# Patient Record
Sex: MALE | Race: WHITE | URBAN - METROPOLITAN AREA
[De-identification: names, ages, dates, MRNs, and addresses within clinical notes are randomized per-mention and may not be internally consistent; named-entity substitution may affect disease eponyms.]

---

## 2019-07-29 NOTE — PATIENT DISCUSSION
(If Pt skips 6 months appt and is closer to 1 year, schedule Exam and OCT ONH (Gonio only if appears narrow).

## 2021-06-08 ENCOUNTER — NEW PATIENT PROBLEM FOCUSED (OUTPATIENT)
Dept: URBAN - METROPOLITAN AREA CLINIC 53 | Facility: CLINIC | Age: 86
End: 2021-06-08

## 2021-06-08 DIAGNOSIS — H11.31: ICD-10-CM

## 2021-06-08 PROCEDURE — 92002 INTRM OPH EXAM NEW PATIENT: CPT

## 2021-06-08 ASSESSMENT — TONOMETRY
OS_IOP_MMHG: 12
OD_IOP_MMHG: 13

## 2021-06-08 ASSESSMENT — VISUAL ACUITY
OD_CC: 20/20
OS_CC: 20/20
OS_CC: J1+
OD_CC: J1+

## 2021-06-08 NOTE — PATIENT DISCUSSION
Reassured Pt and explained condition. Advised may take several weeks to reabsorb completely. ATs recommended prn for comfort. Sample of systane given to patient to use 3 times a day cold from the fridge.  Discussed with patient that it is residual from the skin surgery he had.

## 2021-12-09 ENCOUNTER — COMPREHENSIVE EXAM (OUTPATIENT)
Dept: URBAN - METROPOLITAN AREA CLINIC 49 | Facility: CLINIC | Age: 86
End: 2021-12-09

## 2021-12-09 DIAGNOSIS — R73.09: ICD-10-CM

## 2021-12-09 DIAGNOSIS — H53.2: ICD-10-CM

## 2021-12-09 DIAGNOSIS — H43.813: ICD-10-CM

## 2021-12-09 DIAGNOSIS — H11.31: ICD-10-CM

## 2021-12-09 PROCEDURE — 92134 CPTRZ OPH DX IMG PST SGM RTA: CPT

## 2021-12-09 PROCEDURE — 92014 COMPRE OPH EXAM EST PT 1/>: CPT

## 2021-12-09 ASSESSMENT — TONOMETRY
OD_IOP_MMHG: 14
OS_IOP_MMHG: 16

## 2021-12-09 ASSESSMENT — VISUAL ACUITY
OD_CC: 20/20
OS_CC: 20/20

## 2022-01-05 ENCOUNTER — COMPREHENSIVE EXAM (OUTPATIENT)
Dept: URBAN - METROPOLITAN AREA CLINIC 49 | Facility: CLINIC | Age: 87
End: 2022-01-05

## 2022-01-05 DIAGNOSIS — H11.32: ICD-10-CM

## 2022-01-05 DIAGNOSIS — H01.00A: ICD-10-CM

## 2022-01-05 DIAGNOSIS — H01.00B: ICD-10-CM

## 2022-01-05 PROCEDURE — 92012 INTRM OPH EXAM EST PATIENT: CPT

## 2022-01-05 RX ORDER — ERYTHROMYCIN 5 MG/G
OINTMENT OPHTHALMIC EVERY EVENING
Start: 2022-01-05 | End: 2022-01-12

## 2022-01-05 ASSESSMENT — TONOMETRY
OS_IOP_MMHG: 16
OD_IOP_MMHG: 14

## 2022-01-05 ASSESSMENT — VISUAL ACUITY
OS_CC: 20/20-1
OD_CC: 20/20

## 2022-01-05 NOTE — PATIENT DISCUSSION
Cleaned lids in office with Ocuspft lid scrubs. Recommend start Ocusoft lid scrubs OU Qhs. Start Erythromycin OU Qhs for 7 days.

## 2022-06-29 NOTE — PATIENT DISCUSSION
"Pt reports ""ran out of Timolol years ago"" and has not been using Timolol for ""years"" because ""ran out"". "

## 2022-06-29 NOTE — PATIENT DISCUSSION
IOP elevated today. Advised Pt of elevated IOP and stressed importance of restarting drops to reduce and control IOP. Explained elevated IOP will cause optic nerve damage and permanent vision loss. Will reevaluate IOP in 3-4 weeks on drops.

## 2022-10-12 ENCOUNTER — COMPREHENSIVE EXAM (OUTPATIENT)
Dept: URBAN - METROPOLITAN AREA CLINIC 49 | Facility: CLINIC | Age: 87
End: 2022-10-12

## 2022-10-12 DIAGNOSIS — R73.09: ICD-10-CM

## 2022-10-12 DIAGNOSIS — H11.32: ICD-10-CM

## 2022-10-12 DIAGNOSIS — H43.813: ICD-10-CM

## 2022-10-12 PROCEDURE — 92015 DETERMINE REFRACTIVE STATE: CPT

## 2022-10-12 PROCEDURE — 92014 COMPRE OPH EXAM EST PT 1/>: CPT

## 2022-10-12 ASSESSMENT — VISUAL ACUITY
OS_GLARE: 20/50
OS_GLARE: 20/30
OD_GLARE: 20/40
OD_PH: 20/30
OD_GLARE: 20/25
OD_CC: J1+@17"
OD_SC: J10@17"
OS_SC: 20/30
OS_SC: J10@17"
OS_CC: J1@17"
OD_SC: 20/40
OD_CC: 20/40-1
OS_CC: 20/20

## 2022-10-12 ASSESSMENT — TONOMETRY
OD_IOP_MMHG: 13
OS_IOP_MMHG: 15

## 2023-10-19 ENCOUNTER — COMPREHENSIVE EXAM (OUTPATIENT)
Dept: URBAN - METROPOLITAN AREA CLINIC 49 | Facility: LOCATION | Age: 88
End: 2023-10-19

## 2023-10-19 DIAGNOSIS — H31.091: ICD-10-CM

## 2023-10-19 DIAGNOSIS — R73.09: ICD-10-CM

## 2023-10-19 DIAGNOSIS — H35.373: ICD-10-CM

## 2023-10-19 DIAGNOSIS — H43.813: ICD-10-CM

## 2023-10-19 DIAGNOSIS — H35.40: ICD-10-CM

## 2023-10-19 DIAGNOSIS — H04.123: ICD-10-CM

## 2023-10-19 DIAGNOSIS — H02.413: ICD-10-CM

## 2023-10-19 DIAGNOSIS — H53.2: ICD-10-CM

## 2023-10-19 PROCEDURE — 92015 DETERMINE REFRACTIVE STATE: CPT

## 2023-10-19 PROCEDURE — 99214 OFFICE O/P EST MOD 30 MIN: CPT

## 2023-10-19 PROCEDURE — 92134 CPTRZ OPH DX IMG PST SGM RTA: CPT

## 2023-10-19 ASSESSMENT — VISUAL ACUITY
OD_CC: 20/25-2
OU_CC: J1
OS_CC: 20/20-2

## 2023-10-19 ASSESSMENT — TONOMETRY
OD_IOP_MMHG: 15
OS_IOP_MMHG: 14

## 2025-04-03 ENCOUNTER — COMPREHENSIVE EXAM (OUTPATIENT)
Age: OVER 89
End: 2025-04-03

## 2025-04-03 DIAGNOSIS — H53.2: ICD-10-CM

## 2025-04-03 DIAGNOSIS — H31.091: ICD-10-CM

## 2025-04-03 DIAGNOSIS — H35.373: ICD-10-CM

## 2025-04-03 DIAGNOSIS — H35.40: ICD-10-CM

## 2025-04-03 DIAGNOSIS — H04.123: ICD-10-CM

## 2025-04-03 DIAGNOSIS — R73.09: ICD-10-CM

## 2025-04-03 DIAGNOSIS — H43.813: ICD-10-CM

## 2025-04-03 DIAGNOSIS — H02.413: ICD-10-CM

## 2025-04-03 PROCEDURE — 92134 CPTRZ OPH DX IMG PST SGM RTA: CPT

## 2025-04-03 PROCEDURE — 99214 OFFICE O/P EST MOD 30 MIN: CPT
